# Patient Record
Sex: MALE | Race: WHITE | Employment: FULL TIME | URBAN - METROPOLITAN AREA
[De-identification: names, ages, dates, MRNs, and addresses within clinical notes are randomized per-mention and may not be internally consistent; named-entity substitution may affect disease eponyms.]

---

## 2017-03-06 RX ORDER — METOPROLOL SUCCINATE 50 MG/1
TABLET, EXTENDED RELEASE ORAL
Qty: 90 TABLET | Refills: 1 | Status: SHIPPED | OUTPATIENT
Start: 2017-03-06 | End: 2017-09-02 | Stop reason: SDUPTHER

## 2017-03-06 RX ORDER — ATORVASTATIN CALCIUM 20 MG/1
TABLET, FILM COATED ORAL
Qty: 90 TABLET | Refills: 1 | Status: SHIPPED | OUTPATIENT
Start: 2017-03-06 | End: 2017-09-02 | Stop reason: SDUPTHER

## 2017-04-18 LAB
CHOLESTEROL, TOTAL: 100 MG/DL (ref 0–199)
HDLC SERPL-MCNC: 64 MG/DL (ref 40–60)
LDL CHOLESTEROL CALCULATED: 1 MG/DL
TRIGL SERPL-MCNC: 176 MG/DL (ref 0–150)
VLDLC SERPL CALC-MCNC: 35 MG/DL

## 2017-04-19 ENCOUNTER — OFFICE VISIT (OUTPATIENT)
Dept: CARDIOLOGY CLINIC | Age: 47
End: 2017-04-19

## 2017-04-19 VITALS
HEIGHT: 67 IN | SYSTOLIC BLOOD PRESSURE: 122 MMHG | OXYGEN SATURATION: 98 % | WEIGHT: 184.2 LBS | DIASTOLIC BLOOD PRESSURE: 80 MMHG | HEART RATE: 76 BPM | BODY MASS INDEX: 28.91 KG/M2

## 2017-04-19 DIAGNOSIS — I25.10 CORONARY ARTERY DISEASE INVOLVING NATIVE CORONARY ARTERY OF NATIVE HEART WITHOUT ANGINA PECTORIS: Primary | ICD-10-CM

## 2017-04-19 DIAGNOSIS — E78.5 HYPERLIPIDEMIA LDL GOAL <70: ICD-10-CM

## 2017-04-19 PROCEDURE — 99214 OFFICE O/P EST MOD 30 MIN: CPT | Performed by: INTERNAL MEDICINE

## 2017-04-19 RX ORDER — CLOPIDOGREL BISULFATE 75 MG/1
75 TABLET ORAL DAILY
Qty: 90 TABLET | Refills: 6 | Status: SHIPPED | OUTPATIENT
Start: 2017-04-19 | End: 2018-01-10 | Stop reason: SDUPTHER

## 2017-05-12 ENCOUNTER — TELEPHONE (OUTPATIENT)
Dept: CARDIOLOGY CLINIC | Age: 47
End: 2017-05-12

## 2017-09-05 RX ORDER — ATORVASTATIN CALCIUM 20 MG/1
TABLET, FILM COATED ORAL
Qty: 90 TABLET | Refills: 2 | Status: SHIPPED | OUTPATIENT
Start: 2017-09-05 | End: 2018-01-10 | Stop reason: SDUPTHER

## 2017-09-05 RX ORDER — METOPROLOL SUCCINATE 50 MG/1
TABLET, EXTENDED RELEASE ORAL
Qty: 90 TABLET | Refills: 2 | Status: SHIPPED | OUTPATIENT
Start: 2017-09-05 | End: 2018-01-10 | Stop reason: SDUPTHER

## 2017-10-12 RX ORDER — EVOLOCUMAB 140 MG/ML
INJECTION, SOLUTION SUBCUTANEOUS
Qty: 6 ML | Refills: 6 | Status: SHIPPED | OUTPATIENT
Start: 2017-10-12 | End: 2018-01-09 | Stop reason: SDUPTHER

## 2018-01-05 ENCOUNTER — TELEPHONE (OUTPATIENT)
Dept: CARDIOLOGY CLINIC | Age: 48
End: 2018-01-05

## 2018-01-09 RX ORDER — EVOLOCUMAB 140 MG/ML
INJECTION, SOLUTION SUBCUTANEOUS
Qty: 6 ML | Refills: 6 | Status: SHIPPED | OUTPATIENT
Start: 2018-01-09 | End: 2019-01-26 | Stop reason: SDUPTHER

## 2018-01-10 RX ORDER — ATORVASTATIN CALCIUM 20 MG/1
TABLET, FILM COATED ORAL
Qty: 90 TABLET | Refills: 0 | Status: SHIPPED | OUTPATIENT
Start: 2018-01-10 | End: 2018-03-09 | Stop reason: SDUPTHER

## 2018-01-10 RX ORDER — CLOPIDOGREL BISULFATE 75 MG/1
75 TABLET ORAL DAILY
Qty: 90 TABLET | Refills: 0 | Status: SHIPPED | OUTPATIENT
Start: 2018-01-10 | End: 2018-03-09 | Stop reason: SDUPTHER

## 2018-01-10 RX ORDER — METOPROLOL SUCCINATE 50 MG/1
TABLET, EXTENDED RELEASE ORAL
Qty: 90 TABLET | Refills: 0 | Status: SHIPPED | OUTPATIENT
Start: 2018-01-10 | End: 2018-03-09 | Stop reason: SDUPTHER

## 2018-02-12 ENCOUNTER — TELEPHONE (OUTPATIENT)
Dept: CARDIOLOGY CLINIC | Age: 48
End: 2018-02-12

## 2018-02-12 NOTE — TELEPHONE ENCOUNTER
Yazan Owens from [facility] called in stating she needs an ICD-10 code for the [address] [place] [address].       You can reach Yazan Owens at #[phone]

## 2018-03-12 RX ORDER — METOPROLOL SUCCINATE 50 MG/1
TABLET, EXTENDED RELEASE ORAL
Qty: 90 TABLET | Refills: 2 | Status: SHIPPED | OUTPATIENT
Start: 2018-03-12 | End: 2018-04-20 | Stop reason: SDUPTHER

## 2018-03-12 RX ORDER — CLOPIDOGREL BISULFATE 75 MG/1
75 TABLET ORAL DAILY
Qty: 90 TABLET | Refills: 2 | Status: SHIPPED | OUTPATIENT
Start: 2018-03-12 | End: 2018-04-20 | Stop reason: SDUPTHER

## 2018-03-12 RX ORDER — ATORVASTATIN CALCIUM 20 MG/1
TABLET, FILM COATED ORAL
Qty: 90 TABLET | Refills: 2 | Status: SHIPPED | OUTPATIENT
Start: 2018-03-12 | End: 2018-04-20 | Stop reason: SDUPTHER

## 2018-04-18 DIAGNOSIS — E78.5 HYPERLIPIDEMIA LDL GOAL <70: Primary | ICD-10-CM

## 2018-04-18 DIAGNOSIS — I25.10 CORONARY ARTERY DISEASE INVOLVING NATIVE CORONARY ARTERY OF NATIVE HEART WITHOUT ANGINA PECTORIS: ICD-10-CM

## 2018-04-19 ENCOUNTER — HOSPITAL ENCOUNTER (OUTPATIENT)
Dept: OTHER | Age: 48
Discharge: HOME | End: 2018-04-19
Attending: INTERNAL MEDICINE | Admitting: INTERNAL MEDICINE

## 2018-04-19 LAB
A/G RATIO: 1.7 (ref 1.1–2.2)
ALBUMIN SERPL-MCNC: 4.5 G/DL (ref 3.4–5)
ALP BLD-CCNC: 66 U/L (ref 40–129)
ALT SERPL-CCNC: 40 U/L (ref 10–40)
ANION GAP SERPL CALCULATED.3IONS-SCNC: 15 MMOL/L (ref 3–16)
AST SERPL-CCNC: 28 U/L (ref 15–37)
BILIRUB SERPL-MCNC: 0.6 MG/DL (ref 0–1)
BUN BLDV-MCNC: 12 MG/DL (ref 7–20)
CALCIUM SERPL-MCNC: 9.2 MG/DL (ref 8.3–10.6)
CHLORIDE BLD-SCNC: 99 MMOL/L (ref 99–110)
CHOLESTEROL, TOTAL: 92 MG/DL (ref 0–199)
CO2: 24 MMOL/L (ref 21–32)
CREAT SERPL-MCNC: 0.7 MG/DL (ref 0.9–1.3)
GFR AFRICAN AMERICAN: >60
GFR NON-AFRICAN AMERICAN: >60
GLOBULIN: 2.6 G/DL
GLUCOSE BLD-MCNC: 87 MG/DL (ref 70–99)
HDLC SERPL-MCNC: 61 MG/DL (ref 40–60)
LDL CHOLESTEROL CALCULATED: 15 MG/DL
POTASSIUM SERPL-SCNC: 4.5 MMOL/L (ref 3.5–5.1)
SODIUM BLD-SCNC: 138 MMOL/L (ref 136–145)
TOTAL PROTEIN: 7.1 G/DL (ref 6.4–8.2)
TRIGL SERPL-MCNC: 79 MG/DL (ref 0–150)
VLDLC SERPL CALC-MCNC: 16 MG/DL

## 2018-04-20 ENCOUNTER — OFFICE VISIT (OUTPATIENT)
Dept: CARDIOLOGY CLINIC | Age: 48
End: 2018-04-20

## 2018-04-20 VITALS
DIASTOLIC BLOOD PRESSURE: 78 MMHG | OXYGEN SATURATION: 97 % | SYSTOLIC BLOOD PRESSURE: 120 MMHG | HEIGHT: 67 IN | HEART RATE: 57 BPM | WEIGHT: 180 LBS | BODY MASS INDEX: 28.25 KG/M2

## 2018-04-20 DIAGNOSIS — I25.10 CORONARY ARTERY DISEASE INVOLVING NATIVE CORONARY ARTERY OF NATIVE HEART WITHOUT ANGINA PECTORIS: Primary | ICD-10-CM

## 2018-04-20 DIAGNOSIS — E78.2 MIXED HYPERLIPIDEMIA: ICD-10-CM

## 2018-04-20 PROCEDURE — 99214 OFFICE O/P EST MOD 30 MIN: CPT | Performed by: INTERNAL MEDICINE

## 2018-04-20 RX ORDER — CLOPIDOGREL BISULFATE 75 MG/1
75 TABLET ORAL DAILY
Qty: 90 TABLET | Refills: 2 | Status: SHIPPED | OUTPATIENT
Start: 2018-04-20 | End: 2018-11-19 | Stop reason: SDUPTHER

## 2018-04-20 RX ORDER — METOPROLOL SUCCINATE 50 MG/1
TABLET, EXTENDED RELEASE ORAL
Qty: 90 TABLET | Refills: 2 | Status: SHIPPED | OUTPATIENT
Start: 2018-04-20 | End: 2018-11-19 | Stop reason: SDUPTHER

## 2018-04-20 RX ORDER — ATORVASTATIN CALCIUM 20 MG/1
TABLET, FILM COATED ORAL
Qty: 90 TABLET | Refills: 2 | Status: SHIPPED | OUTPATIENT
Start: 2018-04-20 | End: 2018-11-19 | Stop reason: SDUPTHER

## 2018-11-21 RX ORDER — CLOPIDOGREL BISULFATE 75 MG/1
75 TABLET ORAL DAILY
Qty: 90 TABLET | Refills: 1 | Status: SHIPPED | OUTPATIENT
Start: 2018-11-21 | End: 2019-04-28 | Stop reason: SDUPTHER

## 2018-11-21 RX ORDER — METOPROLOL SUCCINATE 50 MG/1
TABLET, EXTENDED RELEASE ORAL
Qty: 90 TABLET | Refills: 1 | Status: SHIPPED | OUTPATIENT
Start: 2018-11-21 | End: 2019-04-28 | Stop reason: SDUPTHER

## 2018-11-21 RX ORDER — ATORVASTATIN CALCIUM 20 MG/1
TABLET, FILM COATED ORAL
Qty: 90 TABLET | Refills: 1 | Status: SHIPPED | OUTPATIENT
Start: 2018-11-21 | End: 2019-04-28 | Stop reason: SDUPTHER

## 2019-01-28 RX ORDER — EVOLOCUMAB 140 MG/ML
INJECTION, SOLUTION SUBCUTANEOUS
Qty: 140 ML | Refills: 2 | Status: SHIPPED | OUTPATIENT
Start: 2019-01-28 | End: 2019-05-05 | Stop reason: SDUPTHER

## 2019-04-29 RX ORDER — ATORVASTATIN CALCIUM 20 MG/1
TABLET, FILM COATED ORAL
Qty: 90 TABLET | Refills: 1 | Status: SHIPPED | OUTPATIENT
Start: 2019-04-29 | End: 2019-09-10 | Stop reason: SDUPTHER

## 2019-04-29 RX ORDER — METOPROLOL SUCCINATE 50 MG/1
TABLET, EXTENDED RELEASE ORAL
Qty: 90 TABLET | Refills: 1 | Status: SHIPPED | OUTPATIENT
Start: 2019-04-29 | End: 2019-09-10 | Stop reason: SDUPTHER

## 2019-04-29 RX ORDER — CLOPIDOGREL BISULFATE 75 MG/1
75 TABLET ORAL DAILY
Qty: 90 TABLET | Refills: 1 | Status: SHIPPED | OUTPATIENT
Start: 2019-04-29 | End: 2019-09-10 | Stop reason: SDUPTHER

## 2019-05-06 RX ORDER — EVOLOCUMAB 140 MG/ML
INJECTION, SOLUTION SUBCUTANEOUS
Qty: 2 PEN | Refills: 1 | Status: SHIPPED | OUTPATIENT
Start: 2019-05-06 | End: 2019-07-05 | Stop reason: SDUPTHER

## 2019-05-22 ENCOUNTER — TELEPHONE (OUTPATIENT)
Dept: CARDIOLOGY CLINIC | Age: 49
End: 2019-05-22

## 2019-05-22 DIAGNOSIS — I25.10 CORONARY ARTERY DISEASE INVOLVING NATIVE CORONARY ARTERY OF NATIVE HEART WITHOUT ANGINA PECTORIS: Primary | ICD-10-CM

## 2019-05-22 NOTE — TELEPHONE ENCOUNTER
Christen is calling to make his yearly f/u with Willow Alejandra and has an appt on 19 but his both of his lab orders are  and needs to me renewed.     Lipid and CMP

## 2019-05-23 NOTE — TELEPHONE ENCOUNTER
Duncancatarina's wife calling back and I told her that Dr. Pat Parada has placed 2 lab ordered for Gonsalo Mendieta to have before his yearly f/u on 7/11/19

## 2019-07-05 RX ORDER — EVOLOCUMAB 140 MG/ML
INJECTION, SOLUTION SUBCUTANEOUS
Qty: 2 PEN | Refills: 0 | Status: SHIPPED | OUTPATIENT
Start: 2019-07-05 | End: 2019-08-09 | Stop reason: SDUPTHER

## 2019-07-09 NOTE — PROGRESS NOTES
metoprolol succinate (TOPROL XL) 50 MG extended release tablet TAKE 1 TABLET BY MOUTH  DAILY 90 tablet 1    Evolocumab 140 MG/ML SOSY Inject into the skin Bi weekly injection      aspirin EC 81 MG EC tablet Take 1 tablet by mouth daily 30 tablet 6    nitroGLYCERIN (NITROSTAT) 0.4 MG SL tablet Place 1 tablet under the tongue every 5 minutes as needed for Chest pain 25 tablet 3       EC19 SR, rate 65     Stress MPI: 10/5/11  Good exercise capacity. 8 minute Steven protocol. Negative study    Stress test 2017  Normal myocardial perfusion study.    Normal LV size and systolic function.    Normal EKG response with good exercise tolerance and no chest discomfort.    Hypertensive response to exercise.    Whelan Score: 7    Overall findings represent a low risk study      Corornary angiogram  & Intervention: 11  1. One and a half to 2-vessel coronary artery disease. 2. An 80% proximal ramus. 3. A 100% mid-RCA with left to right collaterals. 4. Inferior hypokinesis with ejection fraction of 50%. 5. Left ventricular end-diastolic pressure of 15 mmHg. 2015  1. Right coronary artery 100% occluded in the mid segment proximal to the previously placed stent. 2. Patent left main trunk. 3. A 90% proximal and about 70% to 80% mid left anterior descending stenosis. 4. Patent circumflex artery with collaterals to the distal right. 5. Successful TEVIN deployment in the proxi. LAD, 90% stenosis reduced to 0% Final diameter of 3.08.  6. Direct stenting of the mid LAD with a 70-80% stenosis reduced to 0% with final diameter of 3.07 using TEVIN   7. Mild mid inferior wall hypokinesis with ejection fraction of 50%. Assessment/Plan:      CAD:    S/P stent in the proximal LAD with TEVIN . Has a significant lesion in a good sized ramus.    Denies any angina  Continue medical management     Mixed Hyperlipidemia  Remains on Repatha and Atorvastatin with excellent results  Recent LDL 20 (7/10/19)    Essential

## 2019-07-10 DIAGNOSIS — I25.10 CORONARY ARTERY DISEASE INVOLVING NATIVE CORONARY ARTERY OF NATIVE HEART WITHOUT ANGINA PECTORIS: ICD-10-CM

## 2019-07-10 LAB
A/G RATIO: 2.3 (ref 1.1–2.2)
ALBUMIN SERPL-MCNC: 5 G/DL (ref 3.4–5)
ALP BLD-CCNC: 78 U/L (ref 40–129)
ALT SERPL-CCNC: 74 U/L (ref 10–40)
ANION GAP SERPL CALCULATED.3IONS-SCNC: 18 MMOL/L (ref 3–16)
AST SERPL-CCNC: 53 U/L (ref 15–37)
BILIRUB SERPL-MCNC: 0.7 MG/DL (ref 0–1)
BUN BLDV-MCNC: 9 MG/DL (ref 7–20)
CALCIUM SERPL-MCNC: 9.7 MG/DL (ref 8.3–10.6)
CHLORIDE BLD-SCNC: 96 MMOL/L (ref 99–110)
CHOLESTEROL, TOTAL: 108 MG/DL (ref 0–199)
CO2: 22 MMOL/L (ref 21–32)
CREAT SERPL-MCNC: 0.8 MG/DL (ref 0.9–1.3)
GFR AFRICAN AMERICAN: >60
GFR NON-AFRICAN AMERICAN: >60
GLOBULIN: 2.2 G/DL
GLUCOSE BLD-MCNC: 80 MG/DL (ref 70–99)
HDLC SERPL-MCNC: 71 MG/DL (ref 40–60)
LDL CHOLESTEROL CALCULATED: 20 MG/DL
POTASSIUM SERPL-SCNC: 4.7 MMOL/L (ref 3.5–5.1)
SODIUM BLD-SCNC: 136 MMOL/L (ref 136–145)
TOTAL PROTEIN: 7.2 G/DL (ref 6.4–8.2)
TRIGL SERPL-MCNC: 86 MG/DL (ref 0–150)
VLDLC SERPL CALC-MCNC: 17 MG/DL

## 2019-07-11 ENCOUNTER — OFFICE VISIT (OUTPATIENT)
Dept: CARDIOLOGY CLINIC | Age: 49
End: 2019-07-11
Payer: COMMERCIAL

## 2019-07-11 VITALS
WEIGHT: 182 LBS | SYSTOLIC BLOOD PRESSURE: 138 MMHG | HEIGHT: 67 IN | DIASTOLIC BLOOD PRESSURE: 82 MMHG | BODY MASS INDEX: 28.56 KG/M2 | OXYGEN SATURATION: 97 % | HEART RATE: 64 BPM

## 2019-07-11 DIAGNOSIS — I10 ESSENTIAL HYPERTENSION: ICD-10-CM

## 2019-07-11 DIAGNOSIS — E78.2 MIXED HYPERLIPIDEMIA: ICD-10-CM

## 2019-07-11 DIAGNOSIS — I25.10 CORONARY ARTERY DISEASE INVOLVING NATIVE CORONARY ARTERY OF NATIVE HEART WITHOUT ANGINA PECTORIS: Primary | ICD-10-CM

## 2019-07-11 PROCEDURE — 99214 OFFICE O/P EST MOD 30 MIN: CPT | Performed by: INTERNAL MEDICINE

## 2019-07-11 PROCEDURE — 93000 ELECTROCARDIOGRAM COMPLETE: CPT | Performed by: INTERNAL MEDICINE

## 2019-08-13 RX ORDER — EVOLOCUMAB 140 MG/ML
INJECTION, SOLUTION SUBCUTANEOUS
Qty: 2 PEN | Refills: 3 | Status: SHIPPED | OUTPATIENT
Start: 2019-08-13 | End: 2019-12-16 | Stop reason: SDUPTHER

## 2019-09-11 RX ORDER — ATORVASTATIN CALCIUM 20 MG/1
TABLET, FILM COATED ORAL
Qty: 90 TABLET | Refills: 3 | Status: SHIPPED | OUTPATIENT
Start: 2019-09-11 | End: 2020-01-16 | Stop reason: SDUPTHER

## 2019-09-11 RX ORDER — CLOPIDOGREL BISULFATE 75 MG/1
75 TABLET ORAL DAILY
Qty: 90 TABLET | Refills: 3 | Status: SHIPPED | OUTPATIENT
Start: 2019-09-11 | End: 2020-01-16 | Stop reason: SDUPTHER

## 2019-09-11 RX ORDER — METOPROLOL SUCCINATE 50 MG/1
TABLET, EXTENDED RELEASE ORAL
Qty: 90 TABLET | Refills: 3 | Status: SHIPPED | OUTPATIENT
Start: 2019-09-11 | End: 2020-01-21 | Stop reason: SDUPTHER

## 2019-11-22 ENCOUNTER — TELEPHONE (OUTPATIENT)
Dept: CARDIOLOGY CLINIC | Age: 49
End: 2019-11-22

## 2019-12-16 RX ORDER — EVOLOCUMAB 140 MG/ML
INJECTION, SOLUTION SUBCUTANEOUS
Qty: 2 ML | Refills: 3 | Status: SHIPPED | OUTPATIENT
Start: 2019-12-16 | End: 2020-01-16 | Stop reason: SDUPTHER

## 2020-01-16 RX ORDER — ATORVASTATIN CALCIUM 20 MG/1
TABLET, FILM COATED ORAL
Qty: 90 TABLET | Refills: 3 | Status: SHIPPED | OUTPATIENT
Start: 2020-01-16 | End: 2020-12-21

## 2020-01-16 RX ORDER — EVOLOCUMAB 140 MG/ML
INJECTION, SOLUTION SUBCUTANEOUS
Qty: 2 ML | Refills: 3 | Status: SHIPPED | OUTPATIENT
Start: 2020-01-16 | End: 2020-02-06 | Stop reason: SDUPTHER

## 2020-01-16 RX ORDER — CLOPIDOGREL BISULFATE 75 MG/1
75 TABLET ORAL DAILY
Qty: 90 TABLET | Refills: 3 | Status: SHIPPED | OUTPATIENT
Start: 2020-01-16 | End: 2020-12-21

## 2020-01-21 RX ORDER — METOPROLOL SUCCINATE 50 MG/1
TABLET, EXTENDED RELEASE ORAL
Qty: 90 TABLET | Refills: 3 | Status: SHIPPED | OUTPATIENT
Start: 2020-01-21 | End: 2020-12-23

## 2020-01-21 NOTE — TELEPHONE ENCOUNTER
Medication Refill:  metoprolol succinate (TOPROL XL) 50 MG extended release tablet   TAKE 1 TABLET BY MOUTH  DAILY  90 days  [address], [address] [facility] - [address] [place] [address]        Pt called and stated that [facility] denied filling Repatha and said they need more information about it.

## 2020-02-06 NOTE — TELEPHONE ENCOUNTER
Medication Refill    Medication needing refilled:  Repatha Sureclick 270 mg/ml soaj  Doseage of the medication:  140mg/ml soaj  How are you taking this medication (QD, BID, TID, QID, PRN):   Inject 140mg subcutaneously every 2 weeks  30 or 90 day supply called in:  90 days  Which Pharmacy are we sending the medication to?:  [address] [place] [address] [phone]

## 2020-02-07 RX ORDER — EVOLOCUMAB 140 MG/ML
INJECTION, SOLUTION SUBCUTANEOUS
Qty: 4 ML | Refills: 3 | Status: SHIPPED | OUTPATIENT
Start: 2020-02-07 | End: 2020-03-24 | Stop reason: SDUPTHER

## 2020-03-24 RX ORDER — EVOLOCUMAB 140 MG/ML
INJECTION, SOLUTION SUBCUTANEOUS
Qty: 4 ML | Refills: 3 | Status: SHIPPED | OUTPATIENT
Start: 2020-03-24 | End: 2020-11-30

## 2020-07-09 NOTE — PROGRESS NOTES
[facility]   Office Progress Note         Brodie Abreu MD       CC: \" I walk a lot at work. \"      HPI:  Patient with history of MI, CAD, HLD and HTN ; the patient has history of premature coronary artery disease. His first episode was an inferior MI. He had occluded RCA that was stented and later the stent occluded; the RCAd is now collateralized retrogradely from the left circulation. In addition, he presented a few years later with chest pains and was found to have proximal and mid LAD lesions, both of which were stented (2015). Patient comes for routine check up of CAD and HLD. He has no new complaints today. Says he continues to use Repatha with no issues. Says he walks daily while at work. He has no chest pain with activity. Reports taking all medication as prescribed with no adverse reactions. He has been well. No abnormal bruising or bleeding. Today, specifically denies any chest pain, pressure, tightness, nausea, vomiting, diaphoresis, SOB/HERNANDEZ, palpitations, heart racing, dizziness/lightheadedness, orthopnea, PND, LE edema or syncope. Physical Examination:    /78   Pulse 87   Temp 98.4 °F (36.9 °C)   Ht 5' 7\" (1.702 m)   Wt 185 lb 3.2 oz (84 kg)   SpO2 96%   BMI 29.01 kg/m²    HEENT:  Face: Atraumatic, Conjunctiva: Pink; non icteric,  Mucous Memb:  Moist, No thyromegaly or Lymphadenopathy  Respiratory:  Resp Assessment: normal, Resp Auscultation: clear   Cardiovascular: Auscultation: nl S1 & S2, Palpation:  Nl PMI;  No heaves or thrills, JVP:  normal  Abdomen: Soft, non-tender, Normal bowel sounds,  No organomegaly  Extremities: No Cyanosis or Clubbing; Edema none  Neurological: Oriented to time, place, and person, Non-anxious  Psychiatric: Normal mood and affect  Skin: Warm and dry,  No rash seen    Outpatient Medications Marked as Taking for the 7/13/20 encounter (Office Visit) with Mita Beckford MD   Medication Sig Dispense Refill    REPATHA SURECLICK 242 MG/ML SOAJ INJECT 140MG SUBCUTANEOUSLY EVERY 2 WEEKS 4 mL 3    metoprolol succinate (TOPROL XL) 50 MG extended release tablet TAKE 1 TABLET BY MOUTH  DAILY 90 tablet 3    atorvastatin (LIPITOR) 20 MG tablet TAKE 1 TABLET BY MOUTH  DAILY 90 tablet 3    clopidogrel (PLAVIX) 75 MG tablet Take 1 tablet by mouth daily 90 tablet 3    aspirin EC 81 MG EC tablet Take 1 tablet by mouth daily 30 tablet 6    nitroGLYCERIN (NITROSTAT) 0.4 MG SL tablet Place 1 tablet under the tongue every 5 minutes as needed for Chest pain 25 tablet 3       EC19 SR, rate 65  20 SR        Stress MPI: 10/5/11  Good exercise capacity. 8 minute Steven protocol. Negative study    Stress test 2017  Normal myocardial perfusion study.    Normal LV size and systolic function.    Normal EKG response with good exercise tolerance and no chest discomfort.    Hypertensive response to exercise.    Whelan Score: 7    Overall findings represent a low risk study      Corornary angiogram  & Intervention: 11  1. One and a half to 2-vessel coronary artery disease. 2. An 80% proximal ramus. 3. A 100% mid-RCA with left to right collaterals. 4. Inferior hypokinesis with ejection fraction of 50%. 5. Left ventricular end-diastolic pressure of 15 mmHg. 2015  1. Right coronary artery 100% occluded in the mid segment proximal to the previously placed stent. 2. Patent left main trunk. 3. A 90% proximal and about 70% to 80% mid left anterior descending stenosis. 4. Patent circumflex artery with collaterals to the distal right. 5. Successful TEVIN deployment in the proxi. LAD, 90% stenosis reduced to 0% Final diameter of 3.08.  6. Direct stenting of the mid LAD with a 70-80% stenosis reduced to 0% with final diameter of 3.07 using TEVIN   7. Mild mid inferior wall hypokinesis with ejection fraction of 50%. Assessment/Plan:      CAD:    S/P stent in the proximal LAD with TEVIN .     RCA is occluded and has collaterals coming from the LAD  Has a lesion in a good sized ramus but asymptomatic  No recurrence of chest pain / angina  Continue medical management; Plavix, ASA, Lipitor and Toprol XL    Mixed Hyperlipidemia  LDL goal <70  Excellent control with Repatha and Atorvastatin  Awaiting results of lipid panel     Essential Hypertension  BP is stable  Continue risk factor modifications       Follow up in 1 year      Thank you very much for allowing me to participate in the care of your patient. Please do not hesitate to contact me if you have any questions. Sincerely,    Victorino Goldberg M.D  [facility], [address] [place] [address]  Ph: [phone]  Fax: [phone]    This note was scribed in the presence of Dr. Victorino Goldberg, MD by Dyke Closs    Physician Attestation:  The scribes documentation has been prepared under my direction and personally reviewed by me in its entirety. I confirm that the note above accurately reflects all work, treatment, procedures, and medical decision making performed by me.

## 2020-07-13 ENCOUNTER — OFFICE VISIT (OUTPATIENT)
Dept: CARDIOLOGY CLINIC | Age: 50
End: 2020-07-13
Payer: COMMERCIAL

## 2020-07-13 VITALS
SYSTOLIC BLOOD PRESSURE: 124 MMHG | DIASTOLIC BLOOD PRESSURE: 78 MMHG | HEART RATE: 87 BPM | HEIGHT: 67 IN | TEMPERATURE: 98.4 F | OXYGEN SATURATION: 96 % | BODY MASS INDEX: 29.07 KG/M2 | WEIGHT: 185.2 LBS

## 2020-07-13 DIAGNOSIS — I25.5 ISCHEMIC CARDIOMYOPATHY: ICD-10-CM

## 2020-07-13 DIAGNOSIS — I10 ESSENTIAL HYPERTENSION: ICD-10-CM

## 2020-07-13 LAB
A/G RATIO: 2.1 (ref 1.1–2.2)
ALBUMIN SERPL-MCNC: 5 G/DL (ref 3.4–5)
ALP BLD-CCNC: 84 U/L (ref 40–129)
ALT SERPL-CCNC: 59 U/L (ref 10–40)
ANION GAP SERPL CALCULATED.3IONS-SCNC: 16 MMOL/L (ref 3–16)
AST SERPL-CCNC: 48 U/L (ref 15–37)
BILIRUB SERPL-MCNC: 0.8 MG/DL (ref 0–1)
BUN BLDV-MCNC: 12 MG/DL (ref 7–20)
CALCIUM SERPL-MCNC: 9.4 MG/DL (ref 8.3–10.6)
CHLORIDE BLD-SCNC: 96 MMOL/L (ref 99–110)
CHOLESTEROL, FASTING: 122 MG/DL (ref 0–199)
CO2: 24 MMOL/L (ref 21–32)
CREAT SERPL-MCNC: 0.8 MG/DL (ref 0.9–1.3)
GFR AFRICAN AMERICAN: >60
GFR NON-AFRICAN AMERICAN: >60
GLOBULIN: 2.4 G/DL
GLUCOSE BLD-MCNC: 98 MG/DL (ref 70–99)
HDLC SERPL-MCNC: 65 MG/DL (ref 40–60)
LDL CHOLESTEROL CALCULATED: 38 MG/DL
POTASSIUM SERPL-SCNC: 4.8 MMOL/L (ref 3.5–5.1)
SODIUM BLD-SCNC: 136 MMOL/L (ref 136–145)
TOTAL PROTEIN: 7.4 G/DL (ref 6.4–8.2)
TRIGLYCERIDE, FASTING: 94 MG/DL (ref 0–150)
VLDLC SERPL CALC-MCNC: 19 MG/DL

## 2020-07-13 PROCEDURE — 93000 ELECTROCARDIOGRAM COMPLETE: CPT | Performed by: INTERNAL MEDICINE

## 2020-07-13 PROCEDURE — 99214 OFFICE O/P EST MOD 30 MIN: CPT | Performed by: INTERNAL MEDICINE

## 2020-07-13 NOTE — PATIENT INSTRUCTIONS
Patient Education        A Healthy Heart: Care Instructions  Your Care Instructions     Coronary artery disease, also called heart disease, occurs when a substance called plaque builds up in the vessels that supply oxygen-rich blood to your heart muscle. This can narrow the blood vessels and reduce blood flow. A heart attack happens when blood flow is completely blocked. A high-fat diet, smoking, and other factors increase the risk of heart disease. Your doctor has found that you have a chance of having heart disease. You can do lots of things to keep your heart healthy. It may not be easy, but you can change your diet, exercise more, and quit smoking. These steps really work to lower your chance of heart disease. Follow-up care is a key part of your treatment and safety. Be sure to make and go to all appointments, and call your doctor if you are having problems. It's also a good idea to know your test results and keep a list of the medicines you take. How can you care for yourself at home? Diet  · Use less salt when you cook and eat. This helps lower your blood pressure. Taste food before salting. Add only a little salt when you think you need it. With time, your taste buds will adjust to less salt. · Eat fewer snack items, fast foods, canned soups, and other high-salt, high-fat, processed foods. · Read food labels and try to avoid saturated and trans fats. They increase your risk of heart disease by raising cholesterol levels. · Limit the amount of solid fat-butter, margarine, and shortening-you eat. Use olive, peanut, or canola oil when you cook. Bake, broil, and steam foods instead of frying them. · Eat a variety of fruit and vegetables every day. Dark green, deep orange, red, or yellow fruits and vegetables are especially good for you. Examples include spinach, carrots, peaches, and berries. · Foods high in fiber can reduce your cholesterol and provide important vitamins and minerals.  High-fiber foods include whole-grain cereals and breads, oatmeal, beans, brown rice, citrus fruits, and apples. · Eat lean proteins. Heart-healthy proteins include seafood, lean meats and poultry, eggs, beans, peas, nuts, seeds, and soy products. · Limit drinks and foods with added sugar. These include candy, desserts, and soda pop. Lifestyle changes  · If your doctor recommends it, get more exercise. Walking is a good choice. Bit by bit, increase the amount you walk every day. Try for at least 30 minutes on most days of the week. You also may want to swim, bike, or do other activities. · Do not smoke. If you need help quitting, talk to your doctor about stop-smoking programs and medicines. These can increase your chances of quitting for good. Quitting smoking may be the most important step you can take to protect your heart. It is never too late to quit. · Limit alcohol to 2 drinks a day for men and 1 drink a day for women. Too much alcohol can cause health problems. · Manage other health problems such as diabetes, high blood pressure, and high cholesterol. If you think you may have a problem with alcohol or drug use, talk to your doctor. Medicines  · Take your medicines exactly as prescribed. Call your doctor if you think you are having a problem with your medicine. · If your doctor recommends aspirin, take the amount directed each day. Make sure you take aspirin and not another kind of pain reliever, such as acetaminophen (Tylenol). When should you call for help? [id number] if you have symptoms of a heart attack. These may include:  · Chest pain or pressure, or a strange feeling in the chest.  · Sweating. · Shortness of breath. · Pain, pressure, or a strange feeling in the back, neck, jaw, or upper belly or in one or both shoulders or arms. · Lightheadedness or sudden weakness. · A fast or irregular heartbeat. After you call 911, the  may tell you to chew 1 adult-strength or 2 to 4 low-dose aspirin. Wait for an ambulance. Do not try to drive yourself. Watch closely for changes in your health, and be sure to contact your doctor if you have any problems. Where can you learn more? Go to [email or web address] and sign in to your [employer] account. Enter M939 in the [facility] box to learn more about \"A Healthy Heart: Care Instructions. \"     If you do not have an account, please click on the \"Sign Up Now\" link. Current as of: December 16, 2019               Content Version: 12.5  © 4813-9916 Healthwise, Incorporated. Care instructions adapted under license by [facility] ([facility]). If you have questions about a medical condition or this instruction, always ask your healthcare professional. [address] any warranty or liability for your use of this information.

## 2020-11-30 RX ORDER — EVOLOCUMAB 140 MG/ML
INJECTION, SOLUTION SUBCUTANEOUS
Qty: 4 ML | Refills: 3 | Status: SHIPPED | OUTPATIENT
Start: 2020-11-30 | End: 2021-07-14 | Stop reason: SDUPTHER

## 2020-12-08 ENCOUNTER — TELEPHONE (OUTPATIENT)
Dept: CARDIOLOGY CLINIC | Age: 50
End: 2020-12-08

## 2020-12-08 NOTE — TELEPHONE ENCOUNTER
Usman Noland called in this morning stating that he received a letter stating that his Elfreda Gins is going to need a prior auth. He is wanting to know if Dr. Roberto Sam can start the process?      You can reach Usman Noland at #[phone]

## 2020-12-21 RX ORDER — ATORVASTATIN CALCIUM 20 MG/1
TABLET, FILM COATED ORAL
Qty: 90 TABLET | Refills: 3 | Status: SHIPPED | OUTPATIENT
Start: 2020-12-21 | End: 2021-11-26

## 2020-12-21 RX ORDER — CLOPIDOGREL BISULFATE 75 MG/1
TABLET ORAL
Qty: 90 TABLET | Refills: 3 | Status: SHIPPED | OUTPATIENT
Start: 2020-12-21 | End: 2021-11-26

## 2020-12-23 RX ORDER — METOPROLOL SUCCINATE 50 MG/1
TABLET, EXTENDED RELEASE ORAL
Qty: 90 TABLET | Refills: 3 | Status: SHIPPED | OUTPATIENT
Start: 2020-12-23

## 2021-01-26 NOTE — TELEPHONE ENCOUNTER
Pt called stating he got a denial letter from his insurance about [address]. **Pt states he will fax the denial letter to us and well as a copy of his insurance card. Pt says he still has Linda Gautam.     Please reach out to pt at [phone]

## 2021-02-05 ENCOUNTER — TELEPHONE (OUTPATIENT)
Dept: CARDIOLOGY CLINIC | Age: 51
End: 2021-02-05

## 2021-02-05 NOTE — TELEPHONE ENCOUNTER
Brendan Perkins called from [employer] in [place].  Needs prior authorization for [id number]     Christelle # [phone]

## 2021-05-03 ENCOUNTER — APPOINTMENT (OUTPATIENT)
Dept: GENERAL RADIOLOGY | Age: 51
End: 2021-05-03
Payer: COMMERCIAL

## 2021-05-03 ENCOUNTER — HOSPITAL ENCOUNTER (EMERGENCY)
Age: 51
Discharge: HOME | End: 2021-05-03
Payer: COMMERCIAL

## 2021-05-03 VITALS
WEIGHT: 180.34 LBS | DIASTOLIC BLOOD PRESSURE: 89 MMHG | RESPIRATION RATE: 16 BRPM | SYSTOLIC BLOOD PRESSURE: 150 MMHG | TEMPERATURE: 98.8 F | BODY MASS INDEX: 28.3 KG/M2 | OXYGEN SATURATION: 97 % | HEIGHT: 67 IN | HEART RATE: 74 BPM

## 2021-05-03 DIAGNOSIS — R07.9 CHEST PAIN, UNSPECIFIED TYPE: Primary | ICD-10-CM

## 2021-05-03 LAB
ANION GAP SERPL CALCULATED.3IONS-SCNC: 13 MMOL/L (ref 3–16)
BASOPHILS ABSOLUTE: 0 K/UL (ref 0–0.2)
BASOPHILS RELATIVE PERCENT: 0.9 %
BUN BLDV-MCNC: 7 MG/DL (ref 7–20)
CALCIUM SERPL-MCNC: 9.4 MG/DL (ref 8.3–10.6)
CHLORIDE BLD-SCNC: 100 MMOL/L (ref 99–110)
CO2: 24 MMOL/L (ref 21–32)
CREAT SERPL-MCNC: 0.7 MG/DL (ref 0.9–1.3)
EKG ATRIAL RATE: 68 BPM
EKG DIAGNOSIS: NORMAL
EKG P AXIS: 40 DEGREES
EKG P-R INTERVAL: 122 MS
EKG Q-T INTERVAL: 394 MS
EKG QRS DURATION: 92 MS
EKG QTC CALCULATION (BAZETT): 418 MS
EKG R AXIS: 49 DEGREES
EKG T AXIS: 46 DEGREES
EKG VENTRICULAR RATE: 68 BPM
EOSINOPHILS ABSOLUTE: 0.1 K/UL (ref 0–0.6)
EOSINOPHILS RELATIVE PERCENT: 1.4 %
GFR AFRICAN AMERICAN: >60
GFR NON-AFRICAN AMERICAN: >60
GLUCOSE BLD-MCNC: 90 MG/DL (ref 70–99)
HCT VFR BLD CALC: 47.2 % (ref 40.5–52.5)
HEMOGLOBIN: 16.5 G/DL (ref 13.5–17.5)
LIPASE: 43 U/L (ref 13–60)
LYMPHOCYTES ABSOLUTE: 1 K/UL (ref 1–5.1)
LYMPHOCYTES RELATIVE PERCENT: 20.1 %
MCH RBC QN AUTO: 33.1 PG (ref 26–34)
MCHC RBC AUTO-ENTMCNC: 35 G/DL (ref 31–36)
MCV RBC AUTO: 94.5 FL (ref 80–100)
MONOCYTES ABSOLUTE: 0.6 K/UL (ref 0–1.3)
MONOCYTES RELATIVE PERCENT: 11.6 %
NEUTROPHILS ABSOLUTE: 3.4 K/UL (ref 1.7–7.7)
NEUTROPHILS RELATIVE PERCENT: 66 %
PDW BLD-RTO: 12.4 % (ref 12.4–15.4)
PLATELET # BLD: 287 K/UL (ref 135–450)
PMV BLD AUTO: 7.7 FL (ref 5–10.5)
POTASSIUM REFLEX MAGNESIUM: 4.3 MMOL/L (ref 3.5–5.1)
RBC # BLD: 4.99 M/UL (ref 4.2–5.9)
SODIUM BLD-SCNC: 137 MMOL/L (ref 136–145)
TROPONIN: <0.01 NG/ML
TROPONIN: <0.01 NG/ML
WBC # BLD: 5.2 K/UL (ref 4–11)

## 2021-05-03 PROCEDURE — 80048 BASIC METABOLIC PNL TOTAL CA: CPT

## 2021-05-03 PROCEDURE — 99285 EMERGENCY DEPT VISIT HI MDM: CPT | Performed by: INTERNAL MEDICINE

## 2021-05-03 PROCEDURE — 93005 ELECTROCARDIOGRAM TRACING: CPT | Performed by: PHYSICIAN ASSISTANT

## 2021-05-03 PROCEDURE — 6370000000 HC RX 637 (ALT 250 FOR IP): Performed by: PHYSICIAN ASSISTANT

## 2021-05-03 PROCEDURE — 83690 ASSAY OF LIPASE: CPT

## 2021-05-03 PROCEDURE — 84484 ASSAY OF TROPONIN QUANT: CPT

## 2021-05-03 PROCEDURE — 93017 CV STRESS TEST TRACING ONLY: CPT

## 2021-05-03 PROCEDURE — 85025 COMPLETE CBC W/AUTO DIFF WBC: CPT

## 2021-05-03 PROCEDURE — 99284 EMERGENCY DEPT VISIT MOD MDM: CPT

## 2021-05-03 PROCEDURE — 93010 ELECTROCARDIOGRAM REPORT: CPT | Performed by: INTERNAL MEDICINE

## 2021-05-03 PROCEDURE — 71045 X-RAY EXAM CHEST 1 VIEW: CPT

## 2021-05-03 RX ORDER — NITROGLYCERIN 0.4 MG/1
0.4 TABLET SUBLINGUAL ONCE
Status: COMPLETED | OUTPATIENT
Start: 2021-05-03 | End: 2021-05-03

## 2021-05-03 RX ORDER — ASPIRIN 325 MG
325 TABLET ORAL ONCE
Status: DISCONTINUED | OUTPATIENT
Start: 2021-05-03 | End: 2021-05-03

## 2021-05-03 RX ORDER — ASPIRIN 81 MG/1
243 TABLET, CHEWABLE ORAL ONCE
Status: COMPLETED | OUTPATIENT
Start: 2021-05-03 | End: 2021-05-03

## 2021-05-03 RX ADMIN — ASPIRIN 243 MG: 81 TABLET, CHEWABLE ORAL at 11:17

## 2021-05-03 RX ADMIN — NITROGLYCERIN 0.4 MG: 0.4 TABLET, ORALLY DISINTEGRATING SUBLINGUAL at 11:18

## 2021-05-03 ASSESSMENT — PAIN DESCRIPTION - DESCRIPTORS: DESCRIPTORS: ACHING

## 2021-05-03 ASSESSMENT — HEART SCORE: ECG: 0

## 2021-05-03 NOTE — ED PROVIDER NOTES
[address]       Pt Name: Karuna Cornejo  MRN: [id number]  [place] 1970  Date of evaluation: 5/3/2021  Provider: ZANE Diaz    The ED Attending Physician was available for consultation but did not see or evaluate this patient. CHIEF COMPLAINT       Chief Complaint   Patient presents with    Chest Pain     since mid-week, history of MI and stents       HISTORY OF PRESENT ILLNESS  (Location/Symptom, Timing/Onset, Context/Setting, Quality, Duration, Modifying Factors, Severity.)   Karuna Cornejo is a 52 y.o. male who presents to the emergency department with complaint of chest pain. Patient has a history of coronary artery disease and stenting, and has been 3 to 4 years since the patient last had some cardiac testing done, but he does see cardiology regularly. Says has been taking his prescribed medications. He says that for a little less than a week he has been having some pain, sort of a burning and aching sensation, in the middle of his chest and in the middle of the back, coming and going unpredictably, not really related to eating or to physical activity. Says this happens multiple times per day. He has had some symptoms like this in the past but not as frequently as in the past week. Says he is not sure if it might be coming from his stomach, as he does have some indigestion. He denies any cough, cold symptoms, fever, shortness of breath. Denies abdominal pain. Says he is eating normally. Denies any relevant medical problems or other recent illness. Denies any history of COVID-19 infection and says he has gotten the first dose of the vaccine. No other complaints. Nursing Notes were reviewed and I agree. REVIEW OF SYSTEMS    (2-9 systems for level 4, 10 or more for level 5)     Constitutional:  Negative for fever, chills, unexpected weight change.    HENT:  Negative for congestion, rhinorrhea, sneezing, sore throat, trouble swallowing. Respiratory:  Negative for cough, shortness of breath, wheezing, stridor. Cardiovascular: Positive for chest pain. Negative for palpitations, leg swelling. Gastrointestinal:  Negative for nausea, vomiting, abdominal pain, diarrhea, constipation, blood in stool. Genitourinary:  Negative for dysuria, hematuria, flank pain, groin pain. Musculoskeletal:  Negative for myalgias, arthralgias, neck pain or stiffness. Neurological:  Negative for dizziness, seizures, syncope, focal weakness, numbness, headache. Except as noted above the remainder of the review of systems was reviewed and negative.        PAST MEDICAL HISTORY         Diagnosis Date    CAD (coronary artery disease)     Hyperlipidemia     Hypertension     [facility]     MI (myocardial infarction) ([facility]     Inferior wall MI       SURGICAL HISTORY           Procedure Laterality Date    CARDIOVASCULAR STRESS TEST  10/5/2011    EF 61% / No ischemia    CORONARY ANGIOPLASTY  9/18/2011    % (mid segment)  X1 Xience stent  EF 50%    CORONARY ANGIOPLASTY WITH STENT PLACEMENT      DIAGNOSTIC CARDIAC CATH LAB PROCEDURE  9/18/2011    Left heart Cath /  LAD, Circ, -disease free /  Ramus 80-90% /           CURRENT MEDICATIONS       Discharge Medication List as of 5/3/2021  4:19 PM      CONTINUE these medications which have NOT CHANGED    Details   metoprolol succinate (TOPROL XL) 50 MG extended release tablet TAKE 1 TABLET DAILY, Disp-90 tablet, R-3Normal      clopidogrel (PLAVIX) 75 MG tablet TAKE 1 TABLET DAILY, Disp-90 tablet, R-3Normal      atorvastatin (LIPITOR) 20 MG tablet TAKE 1 TABLET DAILY, Disp-90 tablet, R-3Normal      REPATHA SURECLICK 113 MG/ML SOAJ INJECT 140MG SUBCUTANEOUSLY EVERY 2 WEEKS, Disp-4 mL,R-3,DAWNormal      aspirin EC 81 MG EC tablet Take 1 tablet by mouth daily, Disp-30 tablet, R-6      nitroGLYCERIN (NITROSTAT) 0.4 MG SL tablet Place 1 tablet under the tongue every 5 minutes as LOW K - Abnormal; Notable for the following components:       Result Value    CREATININE 0.7 (*)     All other components within normal limits    Narrative:     Performed at:  [facility]  [address] [facility] [address]   Phone [phone]   CBC WITH AUTO DIFFERENTIAL    Narrative:     Performed at:  [facility]  [address] [facility] [address] [place] [address]   Phone [phone]   LIPASE    Narrative:     Performed at:  [facility]  [address] [place] [address] [facility] [address]   Phone [phone]   TROPONIN    Narrative:     Performed at:  [address] [address]   Phone [phone]   TROPONIN    Narrative:     Performed at:  [address] [address]   Phone [phone]       All other labs were within normal range or not returned as of this dictation. EMERGENCY DEPARTMENT COURSE and DIFFERENTIAL DIAGNOSIS/MDM:   Vitals:    Vitals:    05/03/21 1415 05/03/21 1430 05/03/21 1445 05/03/21 1651   BP: (!) 145/84 (!) 148/100 (!) 150/105 (!) 150/89   Pulse: 80 94 95 74   Resp: 19 24 18 16   Temp:    98.8 °F (37.1 °C)   TempSrc:    Oral   SpO2: 98% 100% 97%    Weight:       Height:           The patient's condition in the ED was good, the patient was afebrile and nontoxic in appearance, and the patient's physical exam was unremarkable. His EKG was nonconcerning. Initial laboratory work-up was good, including a negative troponin. Consulted the patient's cardiologist, Dr. Rosa Beltrán, who visited the patient in the ED and took the patient for cardiac stress test.  This was negative for any signs of ischemia or other concerning abnormalities. There was no indication for hospitalization or further workup. Patient will be discharged.   Says he has an appointment scheduled with his primary care doctor for tomorrow. The patient verbalized understanding and agreement with this plan of care. The patient was advised to return to the emergency department if symptoms should significantly worsen or if new and concerning symptoms should appear. I estimate there is LOW risk for ACUTE CORONARY SYNDROME, PULMONARY EMBOLISM, STROKE, TRANSIENT ISCHEMIC ATTACK, THORACIC AORTIC DISSECTION, HEMORRHAGE, OR CRITICAL CARDIAC ARRHYTHMIA, thus I consider the discharge disposition reasonable. PROCEDURES:  None    FINAL IMPRESSION      1.  Chest pain, unspecified type          DISPOSITION/PLAN   DISPOSITION Decision To Discharge 05/03/2021 04:12:13 PM      PATIENT REFERRED TO:  [place] [address]  [phone]    Go to   Keep your scheduled appointment for tomorrow      DISCHARGE MEDICATIONS:  Discharge Medication List as of 5/3/2021  4:19 PM          (Please note that portions of this note were completed with a voice recognition program.  Efforts were made to edit the dictations but occasionally words are mis-transcribed.)    Viraj Lozada, [address] [place] [address]  05/03/21 [id number]

## 2021-05-03 NOTE — CONSULTS
Max exercise: 10.1 METS    Predicted HR: 175 bpm    % of predicted HR: 103                          Exercise effort:Good    Test duration:7 min    Reason for termination:Physiologic Maximum         Corornary angiogram  & Intervention:11/2015  1. Right coronary artery 100% occluded in the mid segment proximal to the previously placed stent. 2.  Patent left main trunk. 3.  A 90% proximal and about 70% to 80% mid left anterior descending stenosis. 4.  Patent circumflex artery with collaterals to the distal right. 5.  Successful angioplasty followed by TEVIN deployment in the proximal LA. D  6.  Direct stenting of the mid LAD with a 70% to 80% stenosis reduced to 0%   7. LVEF. Mild mid inferior wall hypokinesis with ejection fraction of 50%. ASSESSMENT AND PLAN:      Atypical chest pain  Known history of CAD  Troponin negative  ECG normal   Plain GXT. ordered          Erica Garcia M.D  5/3/2021

## 2021-06-30 NOTE — PROGRESS NOTES
[facility]   Office Progress Note         Rut Bhakta MD       CC: \"I am feeling good\"      HPI:  Patient with history of MI, CAD, HLD and HTN ; the patient has history of premature coronary artery disease. His first episode was an inferior MI. He had occluded RCA that was stented and later the stent occluded; the RCA is now collateralized retrogradely from the left circulation. In addition, he presented a few years later with chest pains and diaphoresis and was found to have proximal and mid LAD lesions, both of which were stented (2015). Pt was seen at [facility] [place] for epigastric pain radiating to his back that occurred at rest and lasted for several hours. Pt ruled out for MI. Plain GXT showed no ischemia. Today, he is here for routine follow up of his CAD. He states that he is feeling well. The patient denies exertional chest pain, palpitations, dizziness, syncope, worsening leg swelling and worsening dyspnea. He states that he was started on lisinopril for improved BP control. He denies any major bleeding issues. He is compliant with his medications and tolerating. Physical Examination:    /74   Pulse 74   Ht 5' 7\" (1.702 m)   Wt 180 lb (81.6 kg)   SpO2 97%   BMI 28.19 kg/m²    HEENT:  Face: Atraumatic, Conjunctiva: Pink; non icteric,  Mucous Memb:  Moist, No thyromegaly or Lymphadenopathy  Respiratory:  Resp Assessment: normal, Resp Auscultation: clear   Cardiovascular: Auscultation: nl S1 & S2, Palpation:  Nl PMI;  No heaves or thrills, JVP:  normal  Abdomen: Soft, non-tender, Normal bowel sounds,  No organomegaly  Extremities: No Cyanosis or Clubbing; Edema none  Neurological: Oriented to time, place, and person, Non-anxious  Psychiatric: Normal mood and affect  Skin: Warm and dry,  No rash seen    Outpatient Medications Marked as Taking for the 7/14/21 encounter (Office Visit) with Tova Redd MD   Medication Sig Dispense Refill    lisinopril (PRINIVIL;ZESTRIL) 10 MG tablet Take 10 mg by mouth daily      metoprolol succinate (TOPROL XL) 50 MG extended release tablet TAKE 1 TABLET DAILY (Patient taking differently: 100 mg TAKE 1 TABLET DAILY) 90 tablet 3    clopidogrel (PLAVIX) 75 MG tablet TAKE 1 TABLET DAILY 90 tablet 3    atorvastatin (LIPITOR) 20 MG tablet TAKE 1 TABLET DAILY 90 tablet 3    REPATHA SURECLICK 653 MG/ML SOAJ INJECT 140MG SUBCUTANEOUSLY EVERY 2 WEEKS 4 mL 3    aspirin EC 81 MG EC tablet Take 1 tablet by mouth daily 30 tablet 6    nitroGLYCERIN (NITROSTAT) 0.4 MG SL tablet Place 1 tablet under the tongue every 5 minutes as needed for Chest pain 25 tablet 3       EC19 SR, rate 65  20 SR   2021 NSR. Stress MPI: 10/5/11  Good exercise capacity. 8 minute Steven protocol. Negative study    Stress test 2017  Normal myocardial perfusion study.    Normal LV size and systolic function.    Normal EKG response with good exercise tolerance and no chest discomfort.    Hypertensive response to exercise.    Whelan Score: 7    Overall findings represent a low risk study     Plain GXT 2021   Negative treadmill exercise stress test.   The patient ex. for 7 min. on the Steven protocol to achieve a HR of 171   which is 99 % of PMHR. No Chest pain or ischemic ST changes. Corornary angiogram  & Intervention: 11  1. One and a half to 2-vessel coronary artery disease. 2. An 80% proximal ramus. 3. A 100% mid-RCA with left to right collaterals. 4. Inferior hypokinesis with ejection fraction of 50%. 5. Left ventricular end-diastolic pressure of 15 mmHg. Cath 2015  1. Right coronary artery 100% occluded in the mid segment proximal to the previously placed stent. 2. Patent left main trunk. 3. A 90% proximal and about 70% to 80% mid left anterior descending stenosis. 4. Patent circumflex artery with collaterals to the distal right. 5. Successful TEVIN deployment in the proxi.  LAD, 90% stenosis reduced to 0% Final diameter of 3.08.  6. Direct stenting of the mid LAD with a 70-80% stenosis reduced to 0% with final diameter of 3.07 using TEVIN   7. Mild mid inferior wall hypokinesis with ejection fraction of 50%. Assessment/Plan:    CAD:    S/P stent in the proximal LAD with TEVIN 2015. RCA is occluded and has collaterals coming from the LAD  Has a lesion in a good sized ramus but asymptomatic  No exertional chest pain / angina  Continue medical management; Plavix, ASA, statin, ACE and Toprol XL  Risks and benefits of long term plavix discussed. In view of multivessel dz, recommend continuing plavix. Agrees to continue plavix for now. Mixed Hyperlipidemia  LDL goal <70  LDL 38, HDL 65 7/2020. LDL 41, HDL 69, TG 96 7/2021. Excellent control with Repatha and Atorvastatin    Essential Hypertension  Controlled  Continue risk factor modifications       Follow up in 1 year      Thank you very much for allowing me to participate in the care of your patient. Please do not hesitate to contact me if you have any questions. Sincerely,    Jack Dinero M.D  [facility], [address] [place] [address]  Ph: [phone]  Fax: [phone]    This note was scribed in the presence of the physician by Alia Flannery RN. Physician Attestation:  The scribes documentation has been prepared under my direction and personally reviewed by me in its entirety. I confirm that the note above accurately reflects all work, treatment, procedures, and medical decision making performed by me.

## 2021-07-12 ENCOUNTER — TELEPHONE (OUTPATIENT)
Dept: CARDIOLOGY CLINIC | Age: 51
End: 2021-07-12

## 2021-07-12 DIAGNOSIS — I10 ESSENTIAL HYPERTENSION: ICD-10-CM

## 2021-07-12 DIAGNOSIS — E78.2 MIXED HYPERLIPIDEMIA: ICD-10-CM

## 2021-07-12 DIAGNOSIS — E78.2 MIXED HYPERLIPIDEMIA: Primary | ICD-10-CM

## 2021-07-12 LAB
A/G RATIO: 2.1 (ref 1.1–2.2)
ALBUMIN SERPL-MCNC: 5 G/DL (ref 3.4–5)
ALP BLD-CCNC: 82 U/L (ref 40–129)
ALT SERPL-CCNC: 45 U/L (ref 10–40)
ANION GAP SERPL CALCULATED.3IONS-SCNC: 14 MMOL/L (ref 3–16)
AST SERPL-CCNC: 35 U/L (ref 15–37)
BILIRUB SERPL-MCNC: 0.7 MG/DL (ref 0–1)
BUN BLDV-MCNC: 9 MG/DL (ref 7–20)
CALCIUM SERPL-MCNC: 9.5 MG/DL (ref 8.3–10.6)
CHLORIDE BLD-SCNC: 96 MMOL/L (ref 99–110)
CHOLESTEROL, FASTING: 129 MG/DL (ref 0–199)
CO2: 25 MMOL/L (ref 21–32)
CREAT SERPL-MCNC: 0.8 MG/DL (ref 0.9–1.3)
GFR AFRICAN AMERICAN: >60
GFR NON-AFRICAN AMERICAN: >60
GLOBULIN: 2.4 G/DL
GLUCOSE BLD-MCNC: 82 MG/DL (ref 70–99)
HDLC SERPL-MCNC: 69 MG/DL (ref 40–60)
LDL CHOLESTEROL CALCULATED: 41 MG/DL
POTASSIUM SERPL-SCNC: 5.1 MMOL/L (ref 3.5–5.1)
SODIUM BLD-SCNC: 135 MMOL/L (ref 136–145)
TOTAL PROTEIN: 7.4 G/DL (ref 6.4–8.2)
TRIGLYCERIDE, FASTING: 96 MG/DL (ref 0–150)
VLDLC SERPL CALC-MCNC: 19 MG/DL

## 2021-07-14 ENCOUNTER — OFFICE VISIT (OUTPATIENT)
Dept: CARDIOLOGY CLINIC | Age: 51
End: 2021-07-14
Payer: COMMERCIAL

## 2021-07-14 VITALS
DIASTOLIC BLOOD PRESSURE: 74 MMHG | HEART RATE: 74 BPM | WEIGHT: 180 LBS | SYSTOLIC BLOOD PRESSURE: 124 MMHG | OXYGEN SATURATION: 97 % | HEIGHT: 67 IN | BODY MASS INDEX: 28.25 KG/M2

## 2021-07-14 DIAGNOSIS — I25.10 CAD IN NATIVE ARTERY: Primary | ICD-10-CM

## 2021-07-14 DIAGNOSIS — E78.2 MIXED HYPERLIPIDEMIA: ICD-10-CM

## 2021-07-14 DIAGNOSIS — I10 ESSENTIAL HYPERTENSION: ICD-10-CM

## 2021-07-14 PROCEDURE — 99214 OFFICE O/P EST MOD 30 MIN: CPT | Performed by: INTERNAL MEDICINE

## 2021-07-14 RX ORDER — LISINOPRIL 10 MG/1
10 TABLET ORAL DAILY
COMMUNITY

## 2021-07-14 RX ORDER — EVOLOCUMAB 140 MG/ML
INJECTION, SOLUTION SUBCUTANEOUS
Qty: 4 ML | Refills: 3 | Status: SHIPPED | OUTPATIENT
Start: 2021-07-14 | End: 2022-04-04

## 2021-07-14 NOTE — PATIENT INSTRUCTIONS

## 2021-09-16 NOTE — ED NOTES
Report given to Gwen Blackburn RN at this time, all questions answered. Denies any further questions at this time. No further patient contact.        Harry Maharaj RN  05/03/21 2428 no

## 2021-11-26 RX ORDER — CLOPIDOGREL BISULFATE 75 MG/1
TABLET ORAL
Qty: 90 TABLET | Refills: 3 | Status: SHIPPED | OUTPATIENT
Start: 2021-11-26

## 2021-11-26 RX ORDER — ATORVASTATIN CALCIUM 20 MG/1
TABLET, FILM COATED ORAL
Qty: 90 TABLET | Refills: 3 | Status: SHIPPED | OUTPATIENT
Start: 2021-11-26

## 2022-01-11 ENCOUNTER — TELEPHONE (OUTPATIENT)
Dept: CARDIOLOGY CLINIC | Age: 52
End: 2022-01-11

## 2022-01-11 NOTE — TELEPHONE ENCOUNTER
Rec'd fax from [address] approving prior authorization of Mango [employer] 949 mg pen valid 12/11/2021 thru 1/11/2023. Case ID:  [id number]  Patient ID [id number]  [address] Juan Manuel spoke with Shana Nicole and made aware. She said that patient also had a copay card and his cost was $5.00 for a four week supply. PA approval scanned into Epic.

## 2022-04-04 RX ORDER — EVOLOCUMAB 140 MG/ML
INJECTION, SOLUTION SUBCUTANEOUS
Qty: 2 ML | Refills: 3 | Status: SHIPPED | OUTPATIENT
Start: 2022-04-04 | End: 2022-08-23

## 2022-04-04 NOTE — TELEPHONE ENCOUNTER
Last OV: 7/14/21  Next OV: 1 yr f/u 7/2022  Last refill:7/14/21  Most recent Labs: 7/12/21  Last EKG (if needed):5/3/21

## 2022-07-13 NOTE — PATIENT INSTRUCTIONS
Patient Education        Heart-Healthy Diet: Care Instructions  Your Care Instructions     A heart-healthy diet has lots of vegetables, fruits, nuts, beans, and whole grains, and is low in salt. It limits foods that are high in saturated fat, such as meats, cheeses, and fried foods. It may be hard to change your diet,but even small changes can lower your risk of heart attack and heart disease. Follow-up care is a key part of your treatment and safety. Be sure to make and go to all appointments, and call your doctor if you are having problems. It's also a good idea to know your test results and keep alist of the medicines you take. How can you care for yourself at home? Watch your portions   Use food labels to learn what the recommended servings are for the foods you eat.  Eat only the number of calories you need to stay at a healthy weight. If you need to lose weight, eat fewer calories than your body burns (through exercise and other physical activity). Eat more fruits and vegetables   Eat a variety of fruit and vegetables every day. Dark green, deep orange, red, or yellow fruits and vegetables are especially good for you. Examples include spinach, carrots, peaches, and berries.  Keep carrots, celery, and other veggies handy for snacks. Buy fruit that is in season and store it where you can see it so that you will be tempted to eat it.  Cook dishes that have a lot of veggies in them, such as stir-fries and soups. Limit saturated fat   Read food labels, and try to avoid saturated fats. They increase your risk of heart disease.  Use olive or canola oil when you cook.  Bake, broil, grill, or steam foods instead of frying them.  Choose lean meats instead of high-fat meats such as hot dogs and sausages. Cut off all visible fat when you prepare meat.  Eat fish, skinless poultry, and meat alternatives such as soy products instead of high-fat meats.  Soy products, such as tofu, may be especially good for your heart.  Choose low-fat or fat-free milk and dairy products. Eat foods high in fiber   Eat a variety of grain products every day. Include whole-grain foods that have lots of fiber and nutrients. Examples of whole-grain foods include oats, whole wheat bread, and brown rice.  Buy whole-grain breads and cereals, instead of white bread or pastries. Limit salt and sodium   Limit how much salt and sodium you eat to help lower your blood pressure.  Taste food before you salt it. Add only a little salt when you think you need it. With time, your taste buds will adjust to less salt.  Eat fewer snack items, fast foods, and other high-salt, processed foods. Check food labels for the amount of sodium in packaged foods.  Choose low-sodium versions of canned goods (such as soups, vegetables, and beans). Limit sugar   Limit drinks and foods with added sugar. These include candy, desserts, and soda pop. Limit alcohol   Limit alcohol to no more than 2 drinks a day for men and 1 drink a day for women. Too much alcohol can cause health problems. When should you call for help? Watch closely for changes in your health, and be sure to contact your doctor if:     You would like help planning heart-healthy meals. Where can you learn more? Go to https://[email or web address].healthStackSearch. org and sign in to your [employer] account. Enter V137 in the [facility] box to learn more about \"Heart-Healthy Diet: Care Instructions. \"     If you do not have an account, please click on the \"Sign Up Now\" link. Current as of: September 8, 2021               Content Version: 13.3  © 9217-2105 Healthwise, [employer]. Care instructions adapted under license by [facility] ([facility]). If you have questions about a medical condition or this instruction, always ask your healthcare professional. [place] [address] any warranty or liability for your use of this information.

## 2022-07-13 NOTE — PROGRESS NOTES
[address]   Office Progress Note         Rick Bueno MD       CC: \"      HPI:  Patient with history of MI, CAD, HLD and HTN ; the patient has history of premature coronary artery disease. His first episode was an inferior MI. He had occluded RCA that was stented and later the stent occluded; the RCA is now collateralized retrogradely from the left circulation. In addition, he presented a few years later with chest pains and diaphoresis and was found to have proximal and mid LAD lesions, both of which were stented (). Pt was seen at [facility], [place] 2021 for epigastric pain radiating to his back that occurred at rest and lasted for several hours. Pt ruled out for MI. Plain GXT showed no ischemia. Today, he is here for routine follow up of his CAD. He states that he is feeling well. The patient denies exertional chest pain, palpitations, dizziness, syncope, worsening leg swelling and worsening dyspnea. He states that he was started on lisinopril for improved BP control. He denies any major bleeding issues. He is compliant with his medications and tolerating. Physical Examination:    There were no vitals taken for this visit. HEENT:  Face: Atraumatic, Conjunctiva: Pink; non icteric,  Mucous Memb:  Moist, No thyromegaly or Lymphadenopathy  Respiratory:  Resp Assessment: normal, Resp Auscultation: clear   Cardiovascular: Auscultation: nl S1 & S2, Palpation:  Nl PMI; No heaves or thrills, JVP:  normal  Abdomen: Soft, non-tender, Normal bowel sounds,  No organomegaly  Extremities: No Cyanosis or Clubbing; Edema none  Neurological: Oriented to time, place, and person, Non-anxious  Psychiatric: Normal mood and affect  Skin: Warm and dry,  No rash seen    No outpatient medications have been marked as taking for the 22 encounter (Appointment) with Michela Morgan MD.       EC19 SR, rate 65  20 SR   2021 NSR. Stress MPI: 10/5/11  Good exercise capacity. 8 minute Steven protocol. Negative study    Stress test 7/1/2017  Normal myocardial perfusion study.    Normal LV size and systolic function.    Normal EKG response with good exercise tolerance and no chest discomfort.    Hypertensive response to exercise.    Whelan Score: 7    Overall findings represent a low risk study     Plain GXT 5/2021   Negative treadmill exercise stress test.   The patient ex. for 7 min. on the Steven protocol to achieve a HR of 171   which is 99 % of PMHR. No Chest pain or ischemic ST changes. Corornary angiogram  & Intervention: 9/18/11  1. One and a half to 2-vessel coronary artery disease. 2. An 80% proximal ramus. 3. A 100% mid-RCA with left to right collaterals. 4. Inferior hypokinesis with ejection fraction of 50%. 5. Left ventricular end-diastolic pressure of 15 mmHg. Cath 11/19/2015  1. Right coronary artery 100% occluded in the mid segment proximal to the previously placed stent. 2. Patent left main trunk. 3. A 90% proximal and about 70% to 80% mid left anterior descending stenosis. 4. Patent circumflex artery with collaterals to the distal right. 5. Successful TEVIN deployment in the proxi. LAD, 90% stenosis reduced to 0% Final diameter of 3.08.  6. Direct stenting of the mid LAD with a 70-80% stenosis reduced to 0% with final diameter of 3.07 using TEVIN   7. Mild mid inferior wall hypokinesis with ejection fraction of 50%. Assessment/Plan:    CAD:    S/P stent in the proximal LAD with TEVIN 2015. RCA is occluded and has collaterals coming from the LAD  Has a lesion in a good sized ramus but asymptomatic  No exertional chest pain / angina  Continue medical management; Plavix, ASA, statin, ACE and Toprol XL  Risks and benefits of long term plavix discussed. In view of multivessel dz, recommend continuing plavix. Agrees to continue plavix for now. Mixed Hyperlipidemia  LDL goal <70  LDL 38, HDL 65 7/2020. LDL 41, HDL 69, TG 96 7/2021.   Excellent control with Repatha and Atorvastatin    Essential Hypertension  Controlled  Continue risk factor modifications       Follow up in 1 year      Thank you very much for allowing me to participate in the care of your patient. Please do not hesitate to contact me if you have any questions.         Sincerely,    Priscila Edmonds M.D  [facility], [address] [place] [address] [place] [address]  Ph: [phone]  Fax: [phone]

## 2022-07-19 NOTE — PROGRESS NOTES
[facility]   Office Progress Note         Amilcar Chase MD       CC: [place] energy level can be better. \"       HPI:    Kolby Wilson has a history of two-vessel coronary disease. He had a stent placed in the RCA which is occluded and collateralized and the LAD has stents placed as well. He is cholesterol is being aggressively controlled with Repatha and atorvastatin. He is doing well reports no chest pain or angina      Previous note  Patient with history of MI, CAD, HLD and HTN ; the patient has history of premature coronary artery disease. His first episode was an inferior MI. He had occluded RCA that was stented and later the stent occluded; the RCA is now collateralized retrogradely from the left circulation. In addition, he presented a few years later with chest pains and diaphoresis and was found to have proximal and mid LAD lesions, both of which were stented (2015). Pt was seen at [facility] [place] 5/[facility]21 for epigastric pain radiating to his back that occurred at rest and lasted for several hours. Pt ruled out for MI. Plain GXT showed no ischemia. He has been on Repatha for several years with great success in lowering LDL. Physical Examination:    /72   Pulse 68   Ht 5' 7\" (1.702 m)   Wt 179 lb (81.2 kg)   SpO2 98%   BMI 28.04 kg/m²    HEENT:  Face: Atraumatic, Conjunctiva: Pink; non icteric,  Mucous Memb:  Moist, No thyromegaly or Lymphadenopathy  Respiratory:  Resp Assessment: normal, Resp Auscultation: clear   Cardiovascular: Auscultation: nl S1 & S2, Palpation:  Nl PMI;  No heaves or thrills, JVP:  normal  Abdomen: Soft, non-tender, Normal bowel sounds,  No organomegaly  Extremities: No Cyanosis or Clubbing; Edema none  Neurological: Oriented to time, place, and person, Non-anxious  Psychiatric: Normal mood and affect  Skin: Warm and dry,  No rash seen    Outpatient Medications Marked as Taking for the 7/20/22 encounter (Office Visit) with Cloteal Habermann, MD   Medication Sig Dispense Refill REPATHA SURECLICK 706 MG/ML SOAJ INJECT 1 PEN SUBCUTANEOUS EVERY 2 WEEKS. 2 mL 3    clopidogrel (PLAVIX) 75 MG tablet TAKE 1 TABLET DAILY 90 tablet 3    atorvastatin (LIPITOR) 20 MG tablet TAKE 1 TABLET DAILY 90 tablet 3    lisinopril (PRINIVIL;ZESTRIL) 10 MG tablet Take 10 mg by mouth daily      metoprolol succinate (TOPROL XL) 50 MG extended release tablet TAKE 1 TABLET DAILY (Patient taking differently: 100 mg TAKE 1 TABLET DAILY) 90 tablet 3    aspirin EC 81 MG EC tablet Take 1 tablet by mouth daily 30 tablet 6    nitroGLYCERIN (NITROSTAT) 0.4 MG SL tablet Place 1 tablet under the tongue every 5 minutes as needed for Chest pain 25 tablet 3       EC19 SR, rate 65  20 SR   2021 NSR.   22 NSR      Stress MPI: 10/5/11  Good exercise capacity. 8 minute Steven protocol. Negative study    Stress test 2017  Normal myocardial perfusion study. Normal LV size and systolic function. Normal EKG response with good exercise tolerance and no chest discomfort. Hypertensive response to exercise. Whelan Score: 7    Overall findings represent a low risk study     Plain GXT 2021   Negative treadmill exercise stress test.   The patient ex. for 7 min. on the Steven protocol to achieve a HR of 171   which is 99 % of PMHR. No Chest pain or ischemic ST changes. [facility] angiogram  & Intervention: 11  1. One and a half to 2-vessel coronary artery disease. 2. An 80% proximal ramus. 3. A 100% mid-RCA with left to right collaterals. 4. Inferior hypokinesis with ejection fraction of 50%. 5. Left ventricular end-diastolic pressure of 15 mmHg. Cath 2015  1. Right coronary artery 100% occluded in the mid segment proximal to the previously placed stent. 2. Patent left main trunk. 3. A 90% proximal and about 70% to 80% mid left anterior descending stenosis. 4. Patent circumflex artery with collaterals to the distal right. 5. Successful TEVIN deployment in the proxi.  LAD, 90% stenosis reduced to 0% Final diameter of 3.08.  6. Direct stenting of the mid LAD with a 70-80% stenosis reduced to 0% with final diameter of 3.07 using TEVIN   7. Mild mid inferior wall hypokinesis with ejection fraction of 50%. Assessment/Plan:    CAD:    S/P stent in the proximal LAD with TEVIN 2015. RCA is occluded and has collaterals coming from the LAD  Has a lesion in a good sized ramus but asymptomatic  Denies any exertional chest pain / angina  Continue medical management; Plavix, ASA, statin, ACE and Toprol XL  Risks and benefits of long term plavix discussed. In view of multivessel disease. Mixed Hyperlipidemia  LDL goal <70  Most recent LDL 41 (7/2021)  Excellent control with Repatha and atorvastatin  Repeat fasting lipid panel before next visit     Essential Hypertension  Controlled  Continue risk factor modifications       Follow up yearly       Thank you very much for allowing me to participate in the care of your patient. Please do not hesitate to contact me if you have any questions. Sincerely,    Maxine Perez M.D  [facility], [address] [place] [address]  Ph: [phone]  Fax: [phone]    This note was scribed in the presence of Dr. Maxine Perez MD by Scarlett Ramos RN    Physician Attestation:  The scribes documentation has been prepared under my direction and personally reviewed by me in its entirety. I confirm that the note above accurately reflects all work, treatment, procedures, and medical decision making performed by me.

## 2022-07-20 ENCOUNTER — OFFICE VISIT (OUTPATIENT)
Dept: CARDIOLOGY CLINIC | Age: 52
End: 2022-07-20
Payer: COMMERCIAL

## 2022-07-20 VITALS
HEART RATE: 68 BPM | BODY MASS INDEX: 28.09 KG/M2 | DIASTOLIC BLOOD PRESSURE: 72 MMHG | WEIGHT: 179 LBS | HEIGHT: 67 IN | SYSTOLIC BLOOD PRESSURE: 110 MMHG | OXYGEN SATURATION: 98 %

## 2022-07-20 DIAGNOSIS — I10 ESSENTIAL HYPERTENSION: ICD-10-CM

## 2022-07-20 DIAGNOSIS — E78.2 MIXED HYPERLIPIDEMIA: ICD-10-CM

## 2022-07-20 DIAGNOSIS — I25.10 CAD IN NATIVE ARTERY: Primary | ICD-10-CM

## 2022-07-20 PROCEDURE — 99214 OFFICE O/P EST MOD 30 MIN: CPT | Performed by: INTERNAL MEDICINE

## 2022-07-20 PROCEDURE — 93000 ELECTROCARDIOGRAM COMPLETE: CPT | Performed by: INTERNAL MEDICINE

## 2022-07-28 DIAGNOSIS — E78.2 MIXED HYPERLIPIDEMIA: ICD-10-CM

## 2022-07-28 LAB
A/G RATIO: 2.9 (ref 1.1–2.2)
ALBUMIN SERPL-MCNC: 4.9 G/DL (ref 3.4–5)
ALP BLD-CCNC: 78 U/L (ref 40–129)
ALT SERPL-CCNC: 52 U/L (ref 10–40)
ANION GAP SERPL CALCULATED.3IONS-SCNC: 9 MMOL/L (ref 3–16)
AST SERPL-CCNC: 34 U/L (ref 15–37)
BILIRUB SERPL-MCNC: 0.4 MG/DL (ref 0–1)
BUN BLDV-MCNC: 10 MG/DL (ref 7–20)
CALCIUM SERPL-MCNC: 9.4 MG/DL (ref 8.3–10.6)
CHLORIDE BLD-SCNC: 101 MMOL/L (ref 99–110)
CHOLESTEROL, TOTAL: 111 MG/DL (ref 0–199)
CO2: 27 MMOL/L (ref 21–32)
CREAT SERPL-MCNC: 0.8 MG/DL (ref 0.9–1.3)
GFR AFRICAN AMERICAN: >60
GFR NON-AFRICAN AMERICAN: >60
GLUCOSE BLD-MCNC: 94 MG/DL (ref 70–99)
HDLC SERPL-MCNC: 51 MG/DL (ref 40–60)
LDL CHOLESTEROL CALCULATED: 31 MG/DL
POTASSIUM SERPL-SCNC: 4.7 MMOL/L (ref 3.5–5.1)
SODIUM BLD-SCNC: 137 MMOL/L (ref 136–145)
TOTAL PROTEIN: 6.6 G/DL (ref 6.4–8.2)
TRIGL SERPL-MCNC: 144 MG/DL (ref 0–150)
VLDLC SERPL CALC-MCNC: 29 MG/DL

## 2022-08-23 RX ORDER — EVOLOCUMAB 140 MG/ML
INJECTION, SOLUTION SUBCUTANEOUS
Qty: 2 ML | Refills: 3 | Status: SHIPPED | OUTPATIENT
Start: 2022-08-23

## 2022-11-29 RX ORDER — CLOPIDOGREL BISULFATE 75 MG/1
TABLET ORAL
Qty: 90 TABLET | Refills: 3 | Status: SHIPPED | OUTPATIENT
Start: 2022-11-29

## 2022-11-29 RX ORDER — ATORVASTATIN CALCIUM 20 MG/1
TABLET, FILM COATED ORAL
Qty: 90 TABLET | Refills: 3 | Status: SHIPPED | OUTPATIENT
Start: 2022-11-29

## 2022-12-08 ENCOUNTER — TELEPHONE (OUTPATIENT)
Dept: CARDIOLOGY CLINIC | Age: 52
End: 2022-12-08

## 2022-12-08 NOTE — TELEPHONE ENCOUNTER
CARDIAC CLEARANCE     Procedure:Colonoscopy  :Dr. Tiffanie Larkin  Date:TBD    Medications needing held:Plavix      How long?:7 Days     Phone Number and Contact Name for Physicians office:Camilla [phone]  Fax number to send [phone]    Cc request has been scanned to patient chart     Clinical staff:    Cardiologist:Dr. Ed Angulo  Last Appointment:7/20/2022  Next Appointment:R/C 5/16/2023    Cardiac History:     Jarrett Cole has a history of two-vessel coronary disease. He had a stent placed in the RCA which is occluded and collateralized and the LAD has stents placed as well. He is cholesterol is being aggressively controlled with Repatha and atorvastatin. He is doing well reports no chest pain or angina.

## 2022-12-08 NOTE — TELEPHONE ENCOUNTER
Dr. Maritza Francisco please review and advise for a pre-operative risk assessment prior to colonoscopy. Requesting to hold the Plavix for 7 days prior.

## 2022-12-09 NOTE — TELEPHONE ENCOUNTER
Antwon Burgess MD  You 35 minutes ago (3:41 PM)     He can hold the Plavix but not aspirin since he has stents in the LAD and occluded RCA. MA's, please prepare a letter for clearance with Dr. Aliya Loaiza instructions, thanks.

## 2023-01-03 RX ORDER — EVOLOCUMAB 140 MG/ML
INJECTION, SOLUTION SUBCUTANEOUS
Qty: 2 ML | Refills: 11 | Status: SHIPPED | OUTPATIENT
Start: 2023-01-03

## 2023-11-16 RX ORDER — ATORVASTATIN CALCIUM 20 MG/1
TABLET, FILM COATED ORAL
Qty: 30 TABLET | Refills: 1 | Status: SHIPPED | OUTPATIENT
Start: 2023-11-16

## 2023-11-16 RX ORDER — CLOPIDOGREL BISULFATE 75 MG/1
TABLET ORAL
Qty: 30 TABLET | Refills: 1 | Status: SHIPPED | OUTPATIENT
Start: 2023-11-16

## 2023-11-16 NOTE — TELEPHONE ENCOUNTER
Last OV: 7/20/22  Next OV: X DUE YEARLY  Last refill: 11/29/22  Most recent Labs: 10/16/23 IN CARE EVERYWHERE  Last EKG (if needed): 7/20/22

## 2024-01-09 ENCOUNTER — OFFICE VISIT (OUTPATIENT)
Dept: CARDIOLOGY CLINIC | Age: 54
End: 2024-01-09
Payer: COMMERCIAL

## 2024-01-09 VITALS
HEART RATE: 74 BPM | WEIGHT: 184 LBS | DIASTOLIC BLOOD PRESSURE: 72 MMHG | SYSTOLIC BLOOD PRESSURE: 120 MMHG | OXYGEN SATURATION: 97 % | HEIGHT: 67 IN | BODY MASS INDEX: 28.88 KG/M2

## 2024-01-09 DIAGNOSIS — I25.10 CAD IN NATIVE ARTERY: Primary | ICD-10-CM

## 2024-01-09 DIAGNOSIS — I10 ESSENTIAL HYPERTENSION: ICD-10-CM

## 2024-01-09 PROCEDURE — 93000 ELECTROCARDIOGRAM COMPLETE: CPT | Performed by: INTERNAL MEDICINE

## 2024-01-09 PROCEDURE — 3074F SYST BP LT 130 MM HG: CPT | Performed by: INTERNAL MEDICINE

## 2024-01-09 PROCEDURE — 99214 OFFICE O/P EST MOD 30 MIN: CPT | Performed by: INTERNAL MEDICINE

## 2024-01-09 PROCEDURE — 3078F DIAST BP <80 MM HG: CPT | Performed by: INTERNAL MEDICINE

## 2024-01-09 RX ORDER — EVOLOCUMAB 140 MG/ML
INJECTION, SOLUTION SUBCUTANEOUS
Qty: 2 ML | Refills: 11 | Status: SHIPPED | OUTPATIENT
Start: 2024-01-09

## 2024-01-09 NOTE — PROGRESS NOTES
[facility]   Office Progress Note         Anatoliy Jack MD       CC: CAD status post stent/hyperlipidemia      HPI:    Tommy has a history of two-vessel coronary disease.  He had a stent placed in the RCA which is occluded and collateralized and the LAD has stents placed as well.  He is cholesterol is being aggressively controlled with Repatha and atorvastatin.  He is doing well reports no chest pain or angina      Wants to know if he should have a test for surveillance.  He does not have angina  Physical Examination:    /72   Pulse 74   Ht 1.702 m (5' 7\")   Wt 83.5 kg (184 lb)   SpO2 97%   BMI 28.82 kg/m²    HEENT:  Face: Atraumatic, Conjunctiva: Pink; non icteric,  Mucous Memb:  Moist, No thyromegaly or Lymphadenopathy  Respiratory:  Resp Assessment: normal, Resp Auscultation: clear   Cardiovascular:  Auscultation: nl S1 & S2, Palpation:  Nl PMI; No heaves or thrills, JVP:  normal  Abdomen: Soft, non-tender, Normal bowel sounds,  No organomegaly  Extremities: No Cyanosis or Clubbing; Edema none  Neurological: Oriented to time, place, and person, Non-anxious  Psychiatric: Normal mood and affect  Skin: Warm and dry,  No rash seen    No outpatient medications have been marked as taking for the 24 encounter (Appointment) with Keanu Wallace MD.       EC19 SR, rate 65  20 SR   2021 NSR.   22 NSR      Stress MPI: 10/5/11  Good exercise capacity.  8 minute Steven protocol. Negative study    Stress test 2017  Normal myocardial perfusion study.    Normal LV size and systolic function.    Normal EKG response with good exercise tolerance and no chest discomfort.    Hypertensive response to exercise.    Whelan Score: 7    Overall findings represent a low risk study     Plain GXT 2021   Negative treadmill exercise stress test.   The patient ex. for 7 min. on the Steven protocol to achieve a HR of 171   which is 99 % of PMHR.   No Chest pain or ischemic ST

## 2024-01-10 RX ORDER — EVOLOCUMAB 140 MG/ML
INJECTION, SOLUTION SUBCUTANEOUS
Qty: 2 ML | Refills: 11 | Status: SHIPPED | OUTPATIENT
Start: 2024-01-10

## 2024-02-20 RX ORDER — EVOLOCUMAB 140 MG/ML
INJECTION, SOLUTION SUBCUTANEOUS
Qty: 2 ML | Refills: 11 | Status: SHIPPED | OUTPATIENT
Start: 2024-02-20

## 2024-02-20 NOTE — TELEPHONE ENCOUNTER
Medication Refill    Medication needing refilled:  REPATHA SURECLICK     Dosage of the medication:  140 MG/ML SOAJ     How are you taking this medication (QD, BID, TID, QID, PRN):   INJECT 1 PEN SUBCUTANEOUSLY EVERY 2 WEEKS.     30 or 90 day supply called in:      Which Pharmacy are we sending the medication to?:    [facility]  [place], [place]  [phone]

## 2024-02-20 NOTE — TELEPHONE ENCOUNTER
Last OV: 1/9/24  Next OV: X due yearly  Last refill: 1/10/24  #2 ml  11  Most recent Labs:   Last EKG (if needed): 1/9/24    Change in pharmacy

## 2024-04-17 RX ORDER — ATORVASTATIN CALCIUM 20 MG/1
TABLET, FILM COATED ORAL
Qty: 30 TABLET | Refills: 11 | Status: SHIPPED | OUTPATIENT
Start: 2024-04-17

## 2024-04-17 RX ORDER — CLOPIDOGREL BISULFATE 75 MG/1
TABLET ORAL
Qty: 30 TABLET | Refills: 11 | Status: SHIPPED | OUTPATIENT
Start: 2024-04-17

## 2024-04-17 NOTE — TELEPHONE ENCOUNTER
Last O/V:  24  Next O/V:  None ( due yearly)   Last Refill: 23   Last Labs:  Chem profile and Lipid profile: 10/16/23  ([facility] EVERYWHERE)   Last EK24

## 2024-12-20 RX ORDER — EVOLOCUMAB 140 MG/ML
INJECTION, SOLUTION SUBCUTANEOUS
Qty: 6 ADJUSTABLE DOSE PRE-FILLED PEN SYRINGE | Refills: 3 | Status: SHIPPED | OUTPATIENT
Start: 2024-12-20

## 2024-12-20 RX ORDER — EVOLOCUMAB 140 MG/ML
INJECTION, SOLUTION SUBCUTANEOUS
Qty: 2 ML | Refills: 11 | OUTPATIENT
Start: 2024-12-20